# Patient Record
Sex: FEMALE | Race: OTHER | NOT HISPANIC OR LATINO
[De-identification: names, ages, dates, MRNs, and addresses within clinical notes are randomized per-mention and may not be internally consistent; named-entity substitution may affect disease eponyms.]

---

## 2017-01-11 ENCOUNTER — APPOINTMENT (OUTPATIENT)
Dept: BREAST CENTER | Facility: CLINIC | Age: 37
End: 2017-01-11

## 2017-01-11 VITALS
HEART RATE: 75 BPM | WEIGHT: 154 LBS | DIASTOLIC BLOOD PRESSURE: 69 MMHG | HEIGHT: 63 IN | TEMPERATURE: 97 F | BODY MASS INDEX: 27.29 KG/M2 | SYSTOLIC BLOOD PRESSURE: 105 MMHG

## 2017-01-12 ENCOUNTER — OUTPATIENT (OUTPATIENT)
Dept: OUTPATIENT SERVICES | Facility: HOSPITAL | Age: 37
LOS: 1 days | End: 2017-01-12
Payer: COMMERCIAL

## 2017-01-12 DIAGNOSIS — N63 UNSPECIFIED LUMP IN BREAST: ICD-10-CM

## 2017-01-12 PROCEDURE — 88173 CYTOPATH EVAL FNA REPORT: CPT

## 2017-01-13 LAB — NON-GYNECOLOGICAL CYTOLOGY STUDY: SIGNIFICANT CHANGE UP

## 2017-01-24 ENCOUNTER — RESULT REVIEW (OUTPATIENT)
Age: 37
End: 2017-01-24

## 2017-01-24 ENCOUNTER — FORM ENCOUNTER (OUTPATIENT)
Age: 37
End: 2017-01-24

## 2017-01-25 ENCOUNTER — OUTPATIENT (OUTPATIENT)
Dept: OUTPATIENT SERVICES | Facility: HOSPITAL | Age: 37
LOS: 1 days | End: 2017-01-25
Payer: COMMERCIAL

## 2017-01-25 PROCEDURE — 19083 BX BREAST 1ST LESION US IMAG: CPT | Mod: RT

## 2017-01-25 PROCEDURE — 19083 BX BREAST 1ST LESION US IMAG: CPT

## 2017-01-25 PROCEDURE — A4648: CPT

## 2017-01-25 PROCEDURE — 76641 ULTRASOUND BREAST COMPLETE: CPT | Mod: 26,RT

## 2017-01-25 PROCEDURE — 88305 TISSUE EXAM BY PATHOLOGIST: CPT

## 2017-01-25 PROCEDURE — 76641 ULTRASOUND BREAST COMPLETE: CPT

## 2017-01-26 LAB — SURGICAL PATHOLOGY STUDY: SIGNIFICANT CHANGE UP

## 2017-03-16 ENCOUNTER — APPOINTMENT (OUTPATIENT)
Dept: PLASTIC SURGERY | Facility: CLINIC | Age: 37
End: 2017-03-16

## 2017-04-23 ENCOUNTER — RESULT REVIEW (OUTPATIENT)
Age: 37
End: 2017-04-23

## 2017-04-24 ENCOUNTER — RX RENEWAL (OUTPATIENT)
Age: 37
End: 2017-04-24

## 2017-04-24 ENCOUNTER — APPOINTMENT (OUTPATIENT)
Dept: PLASTIC SURGERY | Facility: HOSPITAL | Age: 37
End: 2017-04-24

## 2017-04-24 ENCOUNTER — OUTPATIENT (OUTPATIENT)
Dept: OUTPATIENT SERVICES | Facility: HOSPITAL | Age: 37
LOS: 1 days | Discharge: HOME | End: 2017-04-24
Payer: COMMERCIAL

## 2017-04-24 DIAGNOSIS — Z90.13 ACQUIRED ABSENCE OF BILATERAL BREASTS AND NIPPLES: ICD-10-CM

## 2017-04-24 DIAGNOSIS — N65.1 DISPROPORTION OF RECONSTRUCTED BREAST: ICD-10-CM

## 2017-04-24 DIAGNOSIS — E66.9 OBESITY, UNSPECIFIED: ICD-10-CM

## 2017-04-24 DIAGNOSIS — Z85.3 PERSONAL HISTORY OF MALIGNANT NEOPLASM OF BREAST: ICD-10-CM

## 2017-04-24 PROCEDURE — 20926: CPT

## 2017-04-24 PROCEDURE — 19380 REVJ RECONSTRUCTED BREAST: CPT | Mod: 50

## 2017-04-24 PROCEDURE — 19350 NIPPLE/AREOLA RECONSTRUCTION: CPT | Mod: RT

## 2017-05-01 ENCOUNTER — APPOINTMENT (OUTPATIENT)
Dept: PLASTIC SURGERY | Facility: CLINIC | Age: 37
End: 2017-05-01

## 2017-06-08 ENCOUNTER — APPOINTMENT (OUTPATIENT)
Dept: PLASTIC SURGERY | Facility: CLINIC | Age: 37
End: 2017-06-08

## 2017-07-14 ENCOUNTER — APPOINTMENT (OUTPATIENT)
Dept: BREAST CENTER | Facility: CLINIC | Age: 37
End: 2017-07-14

## 2017-09-07 ENCOUNTER — APPOINTMENT (OUTPATIENT)
Dept: PLASTIC SURGERY | Facility: CLINIC | Age: 37
End: 2017-09-07
Payer: MEDICAID

## 2017-09-07 PROCEDURE — 99212 OFFICE O/P EST SF 10 MIN: CPT

## 2017-09-22 ENCOUNTER — APPOINTMENT (OUTPATIENT)
Dept: BREAST CENTER | Facility: CLINIC | Age: 37
End: 2017-09-22
Payer: MEDICAID

## 2017-09-22 VITALS
WEIGHT: 154 LBS | HEART RATE: 76 BPM | TEMPERATURE: 97 F | DIASTOLIC BLOOD PRESSURE: 72 MMHG | SYSTOLIC BLOOD PRESSURE: 106 MMHG | HEIGHT: 63 IN | BODY MASS INDEX: 27.29 KG/M2

## 2017-09-22 PROCEDURE — 99214 OFFICE O/P EST MOD 30 MIN: CPT

## 2017-09-22 RX ORDER — OXYCODONE AND ACETAMINOPHEN 5; 325 MG/1; MG/1
5-325 TABLET ORAL
Qty: 40 | Refills: 0 | Status: COMPLETED | COMMUNITY
Start: 2017-04-24 | End: 2017-09-22

## 2017-09-22 RX ORDER — CLINDAMYCIN HYDROCHLORIDE 300 MG/1
300 CAPSULE ORAL EVERY 6 HOURS
Qty: 12 | Refills: 0 | Status: COMPLETED | COMMUNITY
Start: 2017-04-24 | End: 2017-09-22

## 2017-09-22 RX ORDER — B-COMPLEX WITH VITAMIN C
500-200 TABLET ORAL
Refills: 0 | Status: ACTIVE | COMMUNITY

## 2018-08-10 ENCOUNTER — APPOINTMENT (OUTPATIENT)
Dept: BREAST CENTER | Facility: CLINIC | Age: 38
End: 2018-08-10
Payer: MEDICAID

## 2018-08-10 VITALS
BODY MASS INDEX: 27.29 KG/M2 | TEMPERATURE: 97.4 F | DIASTOLIC BLOOD PRESSURE: 74 MMHG | SYSTOLIC BLOOD PRESSURE: 104 MMHG | HEART RATE: 70 BPM | WEIGHT: 154 LBS | HEIGHT: 63 IN

## 2018-08-10 DIAGNOSIS — Z15.01 GENETIC SUSCEPTIBILITY TO MALIGNANT NEOPLASM OF BREAST: ICD-10-CM

## 2018-08-10 DIAGNOSIS — Z15.09 GENETIC SUSCEPTIBILITY TO MALIGNANT NEOPLASM OF BREAST: ICD-10-CM

## 2018-08-10 PROCEDURE — 99213 OFFICE O/P EST LOW 20 MIN: CPT

## 2019-01-15 ENCOUNTER — CHART COPY (OUTPATIENT)
Age: 39
End: 2019-01-15

## 2019-01-24 ENCOUNTER — APPOINTMENT (OUTPATIENT)
Dept: PLASTIC SURGERY | Facility: CLINIC | Age: 39
End: 2019-01-24
Payer: MEDICAID

## 2019-01-24 VITALS — WEIGHT: 154 LBS | HEIGHT: 63 IN | BODY MASS INDEX: 27.29 KG/M2

## 2019-01-24 PROCEDURE — 99212 OFFICE O/P EST SF 10 MIN: CPT

## 2019-01-24 NOTE — HISTORY OF PRESENT ILLNESS
[FreeTextEntry1] : Here for annual follow up. \par s/p b/l breast reconstruction with SAVANAH flaps\par Well healed after surgery and radiation therapy.\par Some breast asymmetry persists but Ms. Campos does not wish to treat it at this time.\par Reports heavy sensation in left breast. Advised this could be ameliorated by performing a breast reduction on the reconstructed breast. This would have the added benefit of improving symmetry between the radiated and nonradiated breasts.\par Ms. Campos wishes to give this option further consideration before scheduling surgery.\par \par

## 2019-02-06 ENCOUNTER — APPOINTMENT (OUTPATIENT)
Dept: BREAST CENTER | Facility: CLINIC | Age: 39
End: 2019-02-06
Payer: MEDICAID

## 2019-02-06 DIAGNOSIS — C50.911 MALIGNANT NEOPLASM OF UNSPECIFIED SITE OF RIGHT FEMALE BREAST: ICD-10-CM

## 2019-02-06 PROCEDURE — 99213 OFFICE O/P EST LOW 20 MIN: CPT

## 2019-02-06 RX ORDER — ANASTROZOLE TABLETS 1 MG/1
1 TABLET ORAL
Refills: 0 | Status: ACTIVE | COMMUNITY

## 2019-02-06 NOTE — PHYSICAL EXAM
[Normocephalic] : normocephalic [PERRL] : pupils equal, round and reactive to light [Supple] : supple [No Supraclavicular Adenopathy] : no supraclavicular adenopathy [No Axillary Lymphadenopathy] : no left axillary lymphadenopathy [No Edema] : no edema [de-identified] : well healed port excision site infraclavicularly cdi [de-identified] : well healed no nipple sparing, mastectomy incision and evon flap warm, stable nodular tissue 9:30 N7-10, firm, ~3+cm [de-identified] : well healed nipple sparing mastectomy - incision, evon flap warm [de-identified] : left prior subclavicular port site CDI

## 2019-02-06 NOTE — HISTORY OF PRESENT ILLNESS
[FreeTextEntry1] : 38 year old female presents for follow up. She had bilateral mastectomy, left nipple sparing with SAVANAH flap reconstruction on 10/27/2015 for right breast invasive ductal carcinoma. Tumor measured 1.3 cm. 2/32 lymph nodes were positive for disease. She completed chemo 7/5/16 and XRT 9/28/16. She is currently taking Anastrozole managed by Dr. Gómez. To note, she has also undergone an oophorectomy. She has been c/o b/l breast pain and followed up with Dr. Saucedo to discuss reduction of the reconstructed breasts to alleviate some of the heavy sensation. \par \par \par  services used #: [id number]

## 2019-02-14 ENCOUNTER — APPOINTMENT (OUTPATIENT)
Dept: PLASTIC SURGERY | Facility: CLINIC | Age: 39
End: 2019-02-14
Payer: MEDICAID

## 2019-02-14 PROCEDURE — 99212 OFFICE O/P EST SF 10 MIN: CPT

## 2019-02-14 NOTE — HISTORY OF PRESENT ILLNESS
[FreeTextEntry1] : 38 yo female presents for follow up \par s/p b/l breast reconstruction with SAVANAH flaps\par Well healed after surgery and radiation therapy.\par Some breast asymmetry persists.\par Reports heavy sensation in left breast. \par Previously advised this could be ameliorated by performing a breast reduction on the reconstructed breast. This would have the added benefit of improving symmetry between the radiated and nonradiated breasts.\par She met with Dr. Rios for follow up and was cleared to proceed with reduction procedure and is here to plan for the near future. \par Will plan for surgery in the near future.\par \par  services used: [facility] #[id number]

## 2020-05-13 ENCOUNTER — RESULT REVIEW (OUTPATIENT)
Age: 40
End: 2020-05-13

## 2022-04-11 ENCOUNTER — RESULT REVIEW (OUTPATIENT)
Age: 42
End: 2022-04-11